# Patient Record
Sex: FEMALE | Employment: PART TIME | URBAN - METROPOLITAN AREA
[De-identification: names, ages, dates, MRNs, and addresses within clinical notes are randomized per-mention and may not be internally consistent; named-entity substitution may affect disease eponyms.]

---

## 2017-03-12 ENCOUNTER — HOSPITAL ENCOUNTER (EMERGENCY)
Facility: OTHER | Age: 22
Discharge: HOME | End: 2017-03-12
Attending: EMERGENCY MEDICINE
Payer: MEDICAID

## 2017-03-12 VITALS
DIASTOLIC BLOOD PRESSURE: 72 MMHG | OXYGEN SATURATION: 99 % | WEIGHT: 148 LBS | RESPIRATION RATE: 14 BRPM | HEART RATE: 91 BPM | HEIGHT: 64 IN | BODY MASS INDEX: 25.27 KG/M2 | TEMPERATURE: 99 F | SYSTOLIC BLOOD PRESSURE: 113 MMHG

## 2017-03-12 DIAGNOSIS — N30.10 PAIN DUE TO INTERSTITIAL CYSTITIS: ICD-10-CM

## 2017-03-12 DIAGNOSIS — K58.9 IRRITABLE BOWEL SYNDROME, UNSPECIFIED TYPE: Primary | ICD-10-CM

## 2017-03-12 LAB
B-HCG UR QL: NEGATIVE
BILIRUB SERPL-MCNC: NEGATIVE MG/DL
BLOOD, POC UA: NORMAL
CLARITY, POC UA: CLEAR
COLOR, POC UA: YELLOW
CTP QC/QA: YES
GLUCOSE SERPL-MCNC: NEGATIVE MG/DL (ref 70–110)
LEUKOCYTE EST, POC UA: NEGATIVE
NITRITE, POC UA: NEGATIVE
PH SMN: 6.5 [PH]
POC KETONES, BLOOD: NEGATIVE
PROTEIN, POC: NEGATIVE
SPECIFIC GRAVITY, POC UA: 1.02
UROBILINOGEN, POC UA: 1 E.U./DL

## 2017-03-12 PROCEDURE — 81025 URINE PREGNANCY TEST: CPT | Performed by: EMERGENCY MEDICINE

## 2017-03-12 PROCEDURE — 81003 URINALYSIS AUTO W/O SCOPE: CPT

## 2017-03-12 PROCEDURE — 99284 EMERGENCY DEPT VISIT MOD MDM: CPT | Mod: 25

## 2017-03-12 PROCEDURE — 81025 URINE PREGNANCY TEST: CPT

## 2017-03-12 RX ORDER — FAMOTIDINE 20 MG/1
20 TABLET, FILM COATED ORAL 2 TIMES DAILY
Qty: 20 TABLET | Refills: 0 | Status: SHIPPED | OUTPATIENT
Start: 2017-03-12 | End: 2019-05-14

## 2017-03-12 RX ORDER — DICYCLOMINE HYDROCHLORIDE 20 MG/1
20 TABLET ORAL
Qty: 30 TABLET | Refills: 0 | Status: SHIPPED | OUTPATIENT
Start: 2017-03-12 | End: 2017-04-11

## 2017-03-12 RX ORDER — PHENAZOPYRIDINE HYDROCHLORIDE 100 MG/1
200 TABLET, FILM COATED ORAL 3 TIMES DAILY PRN
COMMUNITY

## 2017-03-12 RX ORDER — IBUPROFEN 600 MG/1
600 TABLET ORAL EVERY 6 HOURS PRN
Qty: 20 TABLET | Refills: 0 | Status: SHIPPED | OUTPATIENT
Start: 2017-03-12

## 2017-03-12 NOTE — ED PROVIDER NOTES
Encounter Date: 3/12/2017       History     Chief Complaint   Patient presents with    Dysuria     Currently being treated for IC by Urologist and feels medication not helping      Review of patient's allergies indicates:  No Known Allergies  HPI Comments: Abbey Holman is a 21 y.o. female who presents to the Emergency Department with   pain with urination x 6 months.  Patient states she has a diagnosis of interstitial cystitis for which she takes Pyridium.  Patient feels like the Pyridium is not working.  Pt also reports she gets epigastric burning on and off for months as well as she has a diagnosis of IBS and she feels like she is getting abdominal pain and cramps.  Patient has no current complaints all symptoms been present for greater than 6 months.  Patient states her primary care doctor to get told her to go see a GI doctor but she has not done that yet.  Patient is nontoxic in appearance.  Patient's awake alert oriented ×4 speaking clearly in full sentences.    Patient is a 21 y.o. female presenting with the following complaint: dysuria. The history is provided by the patient and the spouse.   Dysuria    This is a chronic problem. The current episode started several weeks ago. The problem occurs every urination. The problem has been unchanged. The quality of the pain is described as burning. She is sexually active. There is no history of pyelonephritis. Associated symptoms include frequency and urgency. Pertinent negatives include no chills, no sweats, no nausea, no vomiting, no discharge, no hematuria, no hesitancy, no possible pregnancy and no flank pain. Treatments tried:  states she's been taking Pyridium every day for 3 months and is not helping. Her past medical history is significant for recurrent UTIs.     Past Medical History:   Diagnosis Date    GERD (gastroesophageal reflux disease)     IC (interstitial cystitis)      History reviewed. No pertinent surgical history.  Family History    Problem Relation Age of Onset    Breast cancer Maternal Grandmother     Colon cancer Neg Hx     Ovarian cancer Neg Hx      Social History   Substance Use Topics    Smoking status: Never Smoker    Smokeless tobacco: None    Alcohol use No     Review of Systems   Constitutional: Negative for chills and fever.   Gastrointestinal: Positive for abdominal pain. Negative for blood in stool, diarrhea, nausea and vomiting.   Genitourinary: Positive for dysuria, frequency and urgency. Negative for flank pain, hematuria and hesitancy.   Neurological: Negative for dizziness.   All other systems reviewed and are negative.      Physical Exam   Initial Vitals   BP Pulse Resp Temp SpO2   03/12/17 1704 03/12/17 1704 03/12/17 1704 03/12/17 1704 03/12/17 1704   113/72 91 14 98.9 °F (37.2 °C) 99 %     Physical Exam    Nursing note and vitals reviewed.  Constitutional: She appears well-developed and well-nourished. No distress.   HENT:   Head: Normocephalic and atraumatic.   Right Ear: External ear normal.   Left Ear: External ear normal.   Nose: Nose normal.   Mouth/Throat: Oropharynx is clear and moist.   Eyes: EOM are normal. Pupils are equal, round, and reactive to light. Right eye exhibits no discharge. Left eye exhibits no discharge. No scleral icterus.   Neck: Normal range of motion. Neck supple.   Cardiovascular: Normal rate, regular rhythm, normal heart sounds and intact distal pulses.   Pulmonary/Chest: Breath sounds normal. No respiratory distress. She has no wheezes. She has no rhonchi. She has no rales.   Abdominal: Soft. Bowel sounds are normal. She exhibits no distension. There is no tenderness. There is no rebound and no guarding.   Musculoskeletal: Normal range of motion. She exhibits no edema or tenderness.   Neurological: She is alert and oriented to person, place, and time. She has normal strength and normal reflexes.   Skin: Skin is warm and dry.   Psychiatric: She has a normal mood and affect.         ED  Course   Procedures  Labs Reviewed   POCT URINALYSIS W/O SCOPE   POCT URINE PREGNANCY   POCT URINALYSIS W/O SCOPE     UA negative for leukocytes and nitrates.                            ED Course     CC dysuria and abdominal pain for 6 months.  DDx acid reflux, IBS, gastritis, urinary tract infection, and dysuria  Treatment in the ED physical exam.   Discussed with patient need to follow-up with a gastroenterologist as recommended by her primary care .  Also discussed with patient need to eat a high-fiber diet.  Patient states she does not eat fruits or veggies.    Fill and take prescriptions as directed.  Answered questions and discussed discharge plan.    Follow up with PCP in 1 days.  Return to the emergency department if symptoms worsen or do not improve.    Clinical Impression:   The primary encounter diagnosis was Irritable bowel syndrome, unspecified type. A diagnosis of Pain due to interstitial cystitis was also pertinent to this visit.          Inés Benitez DO  03/12/17 1915

## 2017-03-12 NOTE — DISCHARGE INSTRUCTIONS
Medicines for Acid Reflux  Your healthcare provider has told you that you have acid reflux. This condition causes stomach acid to wash up into your throat. For most people, acid reflux is troubling but not dangerous. But left untreated, acid reflux sometimes damages the esophagus. Medicines can help control acid reflux and limit your risk of future problems.  Medicines for acid reflux  Your healthcare provider may prescribe medicine to help treat your acid reflux. Medicine will be based on your symptoms and any test results. Your provider will explain how to take your medicine. You will also be told about possible side effects.  Reducing stomach acid  Your provider may suggest antacids that you can buy over the counter. Antacids can give fast relief. Or you may be told to take a type of medicine called H2 blockers. These are available over the counter and by prescription (for higher doses).  Blocking stomach acid  In more severe cases, your healthcare provider may suggest stronger medicines such as proton pump inhibitors (PPIs). These keep the stomach from making acid. They are often prescribed for long-term use.  Other medicines  In some cases medicines to reduce or block stomach acid may not work. Then you may be switched to another type of medicine that helps your stomach empty better.     Date Last Reviewed: 10/1/2016  © 0422-8199 [employer]. [address] [place] [address], [place] [address]. All rights reserved. This information is not intended as a substitute for professional medical care. Always follow your healthcare professional's instructions.        What is Irritable Bowel Syndrome (IBS)?     Muscle contraction moves food through the digestive tract.      People who have irritable bowel syndrome (IBS) have digestive tracts that react abnormally to certain substances or to stress. This leads to symptoms like cramps, gas, bloating, pain, constipation, and diarrhea. Sometimes called spastic colon,  "IBS is a common condition that is not a disease, but rather a group of symptoms that happen together.  IBS--a motility problem  The muscle movement that passes food through the digestive tract is called motility. When you have IBS, the normal motility of the digestive tract (especially the colon) is disrupted. Motility may speed up, slow down, or become irregular. If stool passes too quickly through the colon, not enough water is absorbed from it. Loose, watery stools (diarrhea) can result. If stool passes through the colon too slowly, too much water is absorbed and the stool becomes hard and dry (constipation). Also, stool and gas may back up and cause painful pressure and cramping. There is no single test that can diagnose IBS. It is a group of symptoms that help your healthcare provider with the diagnosis. Often multiple blood, stool, radiologic tests, or even colonoscopy are performed in the evaluation of people suspected to have IBS. These are done primarily to make sure that there are no other illnesses that can account for your symptoms.   What causes IBS?  A great deal of research has been done on IBS, but the cause is still not known. Some of the possible factors include:   · Smoking, eating certain foods, or drinking alcohol or caffeinated drinks can cause, or "trigger," symptoms of IBS.  · Although no one knows for sure, IBS may be caused by a problem with the nerves or muscles in your digestive tract.  · There is also some evidence that certain bacteria found after a severe gastroinstestinal infection in the small intestine and colon may cause IBS.  · While stress and anxiety worsen the symptoms of IBS, it is not believed to be the cause.   What you can do  Recommendations include:  · Certain medicines may help regulate the working of your digestive tract. Your healthcare provider may prescribe one or more for you.  · Medicine cant cure IBS, but it may help manage the symptoms.  · Because some medicines " may make IBS worse, dont take any medicine, especially laxatives, unless your healthcare provider prescribes it for you.  · Your healthcare provider may suggest some lifestyle changes to help control your IBS. Two of the most important are changing your diet and managing stress. If diet changes are suggested, ask for nutritional guidance from a dietitian so you maintain a healthy nutritional balance in your food intake.   Date Last Reviewed: 7/1/2016  © 9411-7777 [employer]. [address] [place] [address], [place] [address]. All rights reserved. This information is not intended as a substitute for professional medical care. Always follow your healthcare professional's instructions.          Diet and Lifestyle Tips for Irritable Bowel Syndrome (IBS)    Your healthcare professional may suggest some lifestyle changes to help control your IBS. Changing your diet and managing stress are two of the most important. Follow your healthcare providers instructions and try some of the suggestions below.  Change your diet  Your diet may be an important cause of IBS symptoms. You may want to try the following:  · Pay attention to what foods bother you, and avoid them. For example, dairy products are hard for some people to digest.  · Drink 6 to 8 glasses of water a day.  · Avoid caffeine and tobacco. These are muscle stimulants and can affect the working of your digestive tract.  · Avoid alcohol, which can irritate your digestive tract and make your symptoms worse.  · Eat more fiber if constipation is a problem. Fiber makes the stool softer and easier to pass through the colon.  Reduce stress  If stress or anxiety makes your IBS symptoms worse, learning how to manage stress may help you feel better. Try these tips:  · Identify the causes of stress in your life.  · Learn new ways to cope with them.  · Regular exercise is a great way to relieve stress. It can also help ease constipation.  Date Last Reviewed: 7/1/2016  ©  "2647-1784 [employer]. [address] [place] [address], [place] [address]. All rights reserved. This information is not intended as a substitute for professional medical care. Always follow your healthcare professional's instructions.        Dysuria     Painful urination (dysuria) is often caused by a problem in the urinary tract.   Dysuria is pain felt during urination. It is often described as a burning. Learn more about this problem and how it can be treated.  What causes dysuria?  Possible causes include:  · Infection with a bacteria or virus. This can be a urinary tract infection (UTI). Or it may be a sexually transmitted infection (STI).  · Sensitivity or allergy to chemicals. These chemicals are found in lotions and other products.  · Prostate or bladder problems  · Radiation therapy to the pelvic area  How is dysuria diagnosed?  Your healthcare provider will examine you. He or she will ask about your symptoms and health. After talking with you and doing a physical exam, your healthcare provider may know what is causing your dysuria. He or she will usually request  a sample of your urine. Tests of your urine, or a "urinalysis," are done. A urinalysis may include:  · Looking at the urine sample (visual exam)  · Checking for substances (chemical exam)  · Checking a small amount under a microscope (microscopic exam)  Some parts of the urinalysis may be done in the provider's office and some in a lab. And, the urine sample may be checked for bacteria and yeast (urine culture). Your healthcare provider will tell you more about these tests if they are needed.  How is dysuria treated?  Treatment depends on the cause. If you have a bacterial infection, you may need antibiotics. You may be given medications to make it easier for you to urinate and help relieve pain. Your healthcare provider can tell you more about your treatment options. Untreated, symptoms may get worse.  Call the healthcare provider right away " if you have any of the following:  · Fever of 100.4°F (38°C) or higher   · No improvement after three days of treatment  · Trouble urinating because of pain  · New or increased discharge from the vagina or penis  · Rash or joint pain  · Increased back or abdominal pain  · Enlarged painful lymph nodes (lumps) in the groin   Date Last Reviewed: 9/24/2014  © 6258-5899 [employer]. [address] [place] [address], [place] [address]. All rights reserved. This information is not intended as a substitute for professional medical care. Always follow your healthcare professional's instructions.

## 2018-08-09 ENCOUNTER — TELEPHONE (OUTPATIENT)
Dept: OBSTETRICS AND GYNECOLOGY | Facility: CLINIC | Age: 23
End: 2018-08-09

## 2018-08-09 NOTE — TELEPHONE ENCOUNTER
Pt's baby was delivered by Dr Schaffer at  3 + years ago and wants to know if she will be able to access her Medicaid through [employer] so that pt can come in for an annual.

## 2019-05-14 ENCOUNTER — OFFICE VISIT (OUTPATIENT)
Dept: OBSTETRICS AND GYNECOLOGY | Facility: CLINIC | Age: 24
End: 2019-05-14
Payer: MEDICAID

## 2019-05-14 ENCOUNTER — PATIENT MESSAGE (OUTPATIENT)
Dept: OBSTETRICS AND GYNECOLOGY | Facility: CLINIC | Age: 24
End: 2019-05-14

## 2019-05-14 ENCOUNTER — LAB VISIT (OUTPATIENT)
Dept: LAB | Facility: OTHER | Age: 24
End: 2019-05-14
Payer: MEDICAID

## 2019-05-14 ENCOUNTER — TELEPHONE (OUTPATIENT)
Dept: OBSTETRICS AND GYNECOLOGY | Facility: CLINIC | Age: 24
End: 2019-05-14

## 2019-05-14 VITALS
BODY MASS INDEX: 25.95 KG/M2 | DIASTOLIC BLOOD PRESSURE: 80 MMHG | HEIGHT: 64 IN | WEIGHT: 152 LBS | SYSTOLIC BLOOD PRESSURE: 120 MMHG

## 2019-05-14 DIAGNOSIS — Z72.51 UNPROTECTED SEXUAL INTERCOURSE: ICD-10-CM

## 2019-05-14 DIAGNOSIS — Z11.3 ENCOUNTER FOR SCREENING EXAMINATION FOR SEXUALLY TRANSMITTED DISEASE: ICD-10-CM

## 2019-05-14 DIAGNOSIS — D22.9 CHANGE IN SKIN MOLE: ICD-10-CM

## 2019-05-14 DIAGNOSIS — Z01.419 ENCOUNTER FOR WELL WOMAN EXAM: Primary | ICD-10-CM

## 2019-05-14 DIAGNOSIS — N93.9 VAGINAL SPOTTING: ICD-10-CM

## 2019-05-14 LAB
B-HCG UR QL: NEGATIVE
C TRACH DNA SPEC QL NAA+PROBE: NOT DETECTED
CANDIDA RRNA VAG QL PROBE: NEGATIVE
CTP QC/QA: YES
G VAGINALIS RRNA GENITAL QL PROBE: POSITIVE
HAV IGM SERPL QL IA: NEGATIVE
HBV CORE IGM SERPL QL IA: NEGATIVE
HBV SURFACE AG SERPL QL IA: NEGATIVE
HCG INTACT+B SERPL-ACNC: <1.2 MIU/ML
HCV AB SERPL QL IA: NEGATIVE
HIV 1+2 AB+HIV1 P24 AG SERPL QL IA: NEGATIVE
N GONORRHOEA DNA SPEC QL NAA+PROBE: NOT DETECTED
T VAGINALIS RRNA GENITAL QL PROBE: NEGATIVE

## 2019-05-14 PROCEDURE — 87491 CHLMYD TRACH DNA AMP PROBE: CPT

## 2019-05-14 PROCEDURE — 88141 CYTOPATH C/V INTERPRET: CPT | Mod: ,,, | Performed by: PATHOLOGY

## 2019-05-14 PROCEDURE — 36415 COLL VENOUS BLD VENIPUNCTURE: CPT

## 2019-05-14 PROCEDURE — 81025 URINE PREGNANCY TEST: CPT | Mod: PBBFAC | Performed by: NURSE PRACTITIONER

## 2019-05-14 PROCEDURE — 99213 OFFICE O/P EST LOW 20 MIN: CPT | Mod: PBBFAC | Performed by: NURSE PRACTITIONER

## 2019-05-14 PROCEDURE — 86703 HIV-1/HIV-2 1 RESULT ANTBDY: CPT

## 2019-05-14 PROCEDURE — 87624 HPV HI-RISK TYP POOLED RSLT: CPT

## 2019-05-14 PROCEDURE — 80074 ACUTE HEPATITIS PANEL: CPT

## 2019-05-14 PROCEDURE — 99385 PREV VISIT NEW AGE 18-39: CPT | Mod: S$PBB,,, | Performed by: NURSE PRACTITIONER

## 2019-05-14 PROCEDURE — 88141 LIQUID-BASED PAP SMEAR, SCREENING: ICD-10-PCS | Mod: ,,, | Performed by: PATHOLOGY

## 2019-05-14 PROCEDURE — 99385 PR PREVENTIVE VISIT,NEW,18-39: ICD-10-PCS | Mod: S$PBB,,, | Performed by: NURSE PRACTITIONER

## 2019-05-14 PROCEDURE — 99999 PR PBB SHADOW E&M-EST. PATIENT-LVL III: ICD-10-PCS | Mod: PBBFAC,,, | Performed by: NURSE PRACTITIONER

## 2019-05-14 PROCEDURE — 86592 SYPHILIS TEST NON-TREP QUAL: CPT

## 2019-05-14 PROCEDURE — 88142 CYTOPATH C/V THIN LAYER: CPT | Performed by: PATHOLOGY

## 2019-05-14 PROCEDURE — 84702 CHORIONIC GONADOTROPIN TEST: CPT

## 2019-05-14 PROCEDURE — 87510 GARDNER VAG DNA DIR PROBE: CPT

## 2019-05-14 PROCEDURE — 99999 PR PBB SHADOW E&M-EST. PATIENT-LVL III: CPT | Mod: PBBFAC,,, | Performed by: NURSE PRACTITIONER

## 2019-05-14 PROCEDURE — 87480 CANDIDA DNA DIR PROBE: CPT

## 2019-05-14 RX ORDER — METRONIDAZOLE 500 MG/1
500 TABLET ORAL 2 TIMES DAILY
Qty: 14 TABLET | Refills: 0 | Status: SHIPPED | OUTPATIENT
Start: 2019-05-14 | End: 2019-05-21

## 2019-05-14 RX ORDER — MEDROXYPROGESTERONE ACETATE 150 MG/ML
150 INJECTION, SUSPENSION INTRAMUSCULAR
Qty: 1 ML | Refills: 3 | Status: SHIPPED | OUTPATIENT
Start: 2019-05-14 | End: 2020-02-09

## 2019-05-14 RX ORDER — LUBIPROSTONE 24 UG/1
1 CAPSULE ORAL 2 TIMES DAILY PRN
COMMUNITY
Start: 2018-10-27

## 2019-05-14 RX ORDER — ACYCLOVIR 400 MG/1
1 TABLET ORAL 2 TIMES DAILY
COMMUNITY
Start: 2018-10-27

## 2019-05-14 RX ORDER — AMITRIPTYLINE HYDROCHLORIDE 25 MG/1
1 TABLET, FILM COATED ORAL DAILY PRN
COMMUNITY
Start: 2018-10-27

## 2019-05-14 NOTE — PROGRESS NOTES
Chief Complaint   Patient presents with    Vaginal Discharge    Vaginal Itching       History of Present Illness: Abbey Holman is a 24 y.o. female that presents today 2019 for well gyn visit.  [facility]. ; vaginal. Pt denies any abnormal pap smears in the past. She has once monthly cycles with no associated complaints. However, her LMP was 19 and she began spotting 2 days ago. She did have unprotected intercourse this past weekend and is wondering if she may be pregnant. She would like STD testing, including blood work. Pt would also like to discuss birth control options. Pt reports that maternal grandmother had breast cancer. No other complaints or concerns noted.         Past Medical History:   Diagnosis Date    GERD (gastroesophageal reflux disease)     IC (interstitial cystitis)        History reviewed. No pertinent surgical history.    Family History   Problem Relation Age of Onset    Breast cancer Maternal Grandmother     Colon cancer Neg Hx     Ovarian cancer Neg Hx        Social History     Socioeconomic History    Marital status: Unknown     Spouse name: Not on file    Number of children: Not on file    Years of education: Not on file    Highest education level: Not on file   Occupational History    Not on file   Social Needs    Financial resource strain: Not on file    Food insecurity:     Worry: Not on file     Inability: Not on file    Transportation needs:     Medical: Not on file     Non-medical: Not on file   Tobacco Use    Smoking status: Never Smoker   Substance and Sexual Activity    Alcohol use: No    Drug use: No    Sexual activity: Yes     Partners: Male     Birth control/protection: None   Lifestyle    Physical activity:     Days per week: Not on file     Minutes per session: Not on file    Stress: Not on file   Relationships    Social connections:     Talks on phone: Not on file     Gets together:  Not on file     Attends Gnosticism service: Not on file     Active member of club or organization: Not on file     Attends meetings of clubs or organizations: Not on file     Relationship status: Not on file   Other Topics Concern    Not on file   Social History Narrative    Not on file       OB History    Para Term  AB Living   1             SAB TAB Ectopic Multiple Live Births                  # Outcome Date GA Lbr Madi/2nd Weight Sex Delivery Anes PTL Lv   1                 Current Outpatient Medications   Medication Sig Dispense Refill    acyclovir (ZOVIRAX) 400 MG tablet Take 1 mg by mouth 2 (two) times daily.      amitriptyline (ELAVIL) 25 MG tablet Take 1 mg by mouth daily as needed.      linaclotide (LINZESS) 290 mcg Cap Take 290 mcg by mouth once daily.      lubiprostone (AMITIZA) 24 MCG Cap 1 capsule by Other route 2 (two) times daily as needed.      nitrofurantoin, macrocrystal-monohydrate, (MACROBID) 100 MG capsule Take 100 mg by mouth 2 (two) times daily.      phenazopyridine (PYRIDIUM) 100 MG tablet Take 200 mg by mouth 3 (three) times daily as needed for Pain.      ibuprofen (ADVIL,MOTRIN) 600 MG tablet Take 1 tablet (600 mg total) by mouth every 6 (six) hours as needed for Pain. 20 tablet 0    medroxyPROGESTERone (DEPO-PROVERA) 150 mg/mL Syrg Inject 1 mL (150 mg total) into the muscle every 3 (three) months. for 4 doses 1 mL 3     No current facility-administered medications for this visit.        Review of patient's allergies indicates:  No Known Allergies    Review of Symptoms:  GENERAL: Denies weight gain or weight loss. Feeling well overall.   SKIN: Denies rash or lesions.   HEAD: Denies head injury or headache.   NODES: Denies enlarged lymph nodes.   CHEST: Denies chest pain or shortness of breath.   CARDIOVASCULAR: Denies palpitations or left sided chest pain.   ABDOMEN: No abdominal pain, constipation, diarrhea, nausea, vomiting or rectal bleeding.   URINARY: No  "frequency, dysuria, hematuria, or burning on urination.  HEMATOLOGIC: No easy bruisability or excessive bleeding.   MUSCULOSKELETAL: Denies joint pain or swelling.     /80   Ht 5' 4" (1.626 m)   Wt 68.9 kg (152 lb 0.1 oz)   LMP 04/26/2019     Physical Exam:  APPEARANCE: Well nourished, well developed, in no acute distress.  SKIN: Normal skin turgor, no lesions.  NECK: Neck symmetric without masses   RESPIRATORY: Normal respiratory effort with no retractions or use of accessory muscles  ABDOMEN: Soft. No tenderness or masses. No hepatosplenomegaly. No hernias.  BREASTS: Symmetrical, no skin changes or visible lesions. No palpable masses, nipple discharge or adenopathy bilaterally. Approx 1cm mole noted on left breast - pt reports that it has increased in size.  PELVIC: Normal external female genitalia without lesions. Normal hair distribution. Adequate perineal body, normal urethral meatus. Urethra with no masses.  Bladder nontender. Vagina moist and well rugated without lesions, + thick white discharge; no odor noted. Cervix pink and without lesions. No significant cystocele or rectocele. Bimanual exam showed uterus normal size, shape, position, mobile and nontender. Adnexa without masses or tenderness. Urethra and bladder normal. PAP DONE    ASSESSMENT/PLAN:  Encounter for well woman exam  -     Liquid-based pap smear, screening    Unprotected sexual intercourse  -     hCG, quantitative; Future; Expected date: 05/14/2019  -     HIV 1/2 Ag/Ab (4th Gen); Future; Expected date: 05/14/2019  -     RPR; Future; Expected date: 05/14/2019  -     Hepatitis panel, acute; Future; Expected date: 05/14/2019  -     Vaginosis Screen by DNA Probe  -     C. trachomatis/N. gonorrhoeae by AMP DNA [facility]; Cervicovaginal  -     POCT Urine Pregnancy    Encounter for screening examination for sexually transmitted disease  -     HIV 1/2 Ag/Ab (4th Gen); Future; Expected date: 05/14/2019  -     RPR; Future; Expected date: " 05/14/2019  -     Hepatitis panel, acute; Future; Expected date: 05/14/2019  -     Vaginosis Screen by DNA Probe  -     C. trachomatis/N. gonorrhoeae by AMP DNA [facility]; Cervicovaginal    Vaginal spotting  -     POCT Urine Pregnancy    Change in skin mole  -     Ambulatory referral to Dermatology    Other orders  -     medroxyPROGESTERone (DEPO-PROVERA) 150 mg/mL Syrg; Inject 1 mL (150 mg total) into the muscle every 3 (three) months. for 4 doses  Dispense: 1 mL; Refill: 3      UPT (-)      The use of hormonal contraception has been fully discussed with the patient. We discussed all options including OCPs, transdermal patches, vaginal ring, Depo Provera injections, Nexplanon, and IUDs. Warnings about anticipated minor side effects such as breakthrough spotting, nausea, breast tenderness, weight changes, acne, headaches, etc were given.  She has been told of the more serious potential side effects such as MI, stroke, and deep vein thrombosis, all of which are very unlikely.  She has been asked to report any signs of such serious problems immediately. The need for additional protection, such as a condom, to prevent exposure to sexually transmitted diseases has also been discussed- the patient has been clearly reminded that no hormonal contraceptive method can protect her against diseases such as HIV and others. She understands and wishes to take the medication as prescribed. She wishes to begin Depo-Provera - if HCG in non-pregnant range.    -Recommended pt see dermatology for possible removal of mole 2/2 change in size. Pt verbalized understanding.     -Discussed vaginal spotting with pt and potential causes STDs, pregnancy, etc...    Patient was counseled today on Pap guidelines, recommendation for pelvic exams, mammograms starting annually at age 40, Colonoscopy after the age of 50, Dexa Bone Scan and calcium and vitamin D supplementation in menopause and to see her PCP for other health maintenance.        Follow-up:  Will f/u with results  RTC in 1 year for well woman exam or as needed

## 2019-05-14 NOTE — TELEPHONE ENCOUNTER
Called pt to give HCG results - no answer.    Informed her that she could proceed with getting her depo injection today.

## 2019-05-15 LAB — RPR SER QL: NORMAL

## 2019-05-16 ENCOUNTER — TELEPHONE (OUTPATIENT)
Dept: OBSTETRICS AND GYNECOLOGY | Facility: CLINIC | Age: 24
End: 2019-05-16

## 2019-05-16 NOTE — TELEPHONE ENCOUNTER
Patient was contacted and BV result discussed in detail.  She reports all questions answered.     ----- Message from Milli Conde MA sent at 5/16/2019  6:32 PM CDT -----  Contact: GARTH MUÑOZ [[id number]]      ----- Message -----  From: Zoila Rob  Sent: 5/16/2019   9:13 AM  To: [facility] RO[facility] Staff    Name of Who is Calling:GARTH MUÑOZ [[id number]]       What is the request in detail: Pt is requesting a call back from clinical team in regard to her test results Please contact to further discuss and advise.          Can the clinic reply by [facility]:       What Number to Call Back if not in [facility]: [phone]

## 2019-05-17 ENCOUNTER — PATIENT MESSAGE (OUTPATIENT)
Dept: OBSTETRICS AND GYNECOLOGY | Facility: CLINIC | Age: 24
End: 2019-05-17

## 2019-05-28 ENCOUNTER — PATIENT MESSAGE (OUTPATIENT)
Dept: OBSTETRICS AND GYNECOLOGY | Facility: CLINIC | Age: 24
End: 2019-05-28

## 2019-05-28 LAB
HPV HR 12 DNA CVX QL NAA+PROBE: POSITIVE
HPV16 AG SPEC QL: NEGATIVE
HPV18 DNA SPEC QL NAA+PROBE: NEGATIVE

## 2020-03-19 ENCOUNTER — TELEPHONE (OUTPATIENT)
Dept: UROGYNECOLOGY | Facility: CLINIC | Age: 25
End: 2020-03-19

## 2020-05-27 ENCOUNTER — TELEPHONE (OUTPATIENT)
Dept: UROLOGY | Facility: CLINIC | Age: 25
End: 2020-05-27

## 2020-05-27 NOTE — TELEPHONE ENCOUNTER
LVM for patient in reference to appointment that was cancel to offer to reschedule. Left office phone on msg for patient to call the office back.    --Morena Jennings

## 2021-04-16 ENCOUNTER — PATIENT MESSAGE (OUTPATIENT)
Dept: RESEARCH | Facility: HOSPITAL | Age: 26
End: 2021-04-16

## 2022-06-15 NOTE — TELEPHONE ENCOUNTER
----- Message from Freya Garibay sent at 3/19/2020 10:16 AM CDT -----  Contact: GARTH MUÑOZ [[id number]]   Name of Who is Calling:     What is the request in detail: patient request call back in reference to questions/concerns about appointment 03/20/20   Please contact to further discuss and advise      Can the clinic reply by [facility]: no     What Number to Call Back if not in [facility]:  [phone]  
Attempted to return pt's call. Pt did not answer. LM to call office  
denies